# Patient Record
Sex: MALE | Race: WHITE | NOT HISPANIC OR LATINO | URBAN - METROPOLITAN AREA
[De-identification: names, ages, dates, MRNs, and addresses within clinical notes are randomized per-mention and may not be internally consistent; named-entity substitution may affect disease eponyms.]

---

## 2022-06-03 ENCOUNTER — APPOINTMENT (OUTPATIENT)
Dept: URBAN - METROPOLITAN AREA CLINIC 237 | Age: 27
Setting detail: DERMATOLOGY
End: 2022-06-03

## 2022-06-03 DIAGNOSIS — L738 OTHER SPECIFIED DISEASES OF HAIR AND HAIR FOLLICLES: ICD-10-CM

## 2022-06-03 DIAGNOSIS — L663 OTHER SPECIFIED DISEASES OF HAIR AND HAIR FOLLICLES: ICD-10-CM

## 2022-06-03 PROBLEM — L02.02 FURUNCLE OF FACE: Status: ACTIVE | Noted: 2022-06-03

## 2022-06-03 PROBLEM — L02.223 FURUNCLE OF CHEST WALL: Status: ACTIVE | Noted: 2022-06-03

## 2022-06-03 PROCEDURE — OTHER PATIENT SPECIFIC COUNSELING: OTHER

## 2022-06-03 PROCEDURE — OTHER PRESCRIPTION: OTHER

## 2022-06-03 PROCEDURE — 99203 OFFICE O/P NEW LOW 30 MIN: CPT

## 2022-06-03 PROCEDURE — OTHER COUNSELING: OTHER

## 2022-06-03 PROCEDURE — OTHER TREATMENT REGIMEN: OTHER

## 2022-06-03 PROCEDURE — OTHER MEDICATION COUNSELING: OTHER

## 2022-06-03 PROCEDURE — OTHER ORDER TESTS: OTHER

## 2022-06-03 RX ORDER — DOXYCYCLINE HYCLATE 100 MG/1
CAPSULE, GELATIN COATED ORAL
Qty: 28 | Refills: 0 | Status: ERX | COMMUNITY
Start: 2022-06-03

## 2022-06-03 ASSESSMENT — LOCATION DETAILED DESCRIPTION DERM
LOCATION DETAILED: RIGHT NARIS
LOCATION DETAILED: LEFT NARIS
LOCATION DETAILED: STERNUM

## 2022-06-03 ASSESSMENT — LOCATION ZONE DERM
LOCATION ZONE: NOSE
LOCATION ZONE: TRUNK

## 2022-06-03 ASSESSMENT — LOCATION SIMPLE DESCRIPTION DERM
LOCATION SIMPLE: CHEST
LOCATION SIMPLE: LEFT NOSE
LOCATION SIMPLE: RIGHT NOSE

## 2022-06-03 NOTE — HPI: RASH
How Severe Is Your Rash?: moderate
Is This A New Presentation, Or A Follow-Up?: Rash
Additional History: Has been to urgent care 3x for this. States the rash presents as pustules ranging in size from dime to quarter. States one will come up, then go away , then another one will start. Pt stopped picking at pustules about 1mth ago and has noticed improvement.

## 2022-06-03 NOTE — PROCEDURE: TREATMENT REGIMEN
Samples Given: Cetaphil and neutrogena moisturizers for chest
Otc Regimen: Bp 10% wash, apply in shower daily to chest
Initiate Treatment: doxycycline hyclate 100 mg capsule. Take 1 cap PO BID x 14 days.  Take with food and water.  Do not lie down for an hour after taking
Detail Level: Zone
Continue Regimen: Clindamycin solution qday to chest

## 2022-07-18 NOTE — PROCEDURE: MEDICATION COUNSELING
Doxepin Pregnancy And Lactation Text: This medication is Pregnancy Category C and it isn't known if it is safe during pregnancy. It is also excreted in breast milk and breast feeding isn't recommended. Statement Selected

## 2023-07-27 NOTE — PROCEDURE: MEDICATION COUNSELING
Patient tolerated procedure well.  Bandage applied and remains clean, dry and intact to the injection site.  Pain, quality of gate, and vitals all remain at baseline at this time.  Discharge teaching provided to patient regarding Radio Frequency Ablation.  Questions answered, pt verbalized understanding.  Follow-up appointment to be made at patient's discretion.  Patient discharged to home.   Xolair Pregnancy And Lactation Text: This medication is Pregnancy Category B and is considered safe during pregnancy. This medication is excreted in breast milk.